# Patient Record
Sex: FEMALE | Race: WHITE
[De-identification: names, ages, dates, MRNs, and addresses within clinical notes are randomized per-mention and may not be internally consistent; named-entity substitution may affect disease eponyms.]

---

## 2019-06-07 ENCOUNTER — HOSPITAL ENCOUNTER (EMERGENCY)
Dept: HOSPITAL 12 - ER | Age: 64
LOS: 1 days | Discharge: HOME | End: 2019-06-08
Payer: COMMERCIAL

## 2019-06-07 VITALS — WEIGHT: 167 LBS | HEIGHT: 66 IN | BODY MASS INDEX: 26.84 KG/M2
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
## 2019-06-07 DIAGNOSIS — T18.128A: Primary | ICD-10-CM
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
## 2019-06-07 DIAGNOSIS — J45.909: ICD-10-CM

## 2019-06-07 PROCEDURE — A4663 DIALYSIS BLOOD PRESSURE CUFF: HCPCS

## 2019-06-07 NOTE — NUR
pt stated at 1500 she felt something in her throat she tried to used different 
methods of getting it out but was unsuccessful and gave to the emergency room 
pt is able to swallow, talk and breath normally without incident but her 
tonsils are swollen with red dots displayed

## 2019-06-08 VITALS — SYSTOLIC BLOOD PRESSURE: 122 MMHG | DIASTOLIC BLOOD PRESSURE: 76 MMHG

## 2019-06-08 NOTE — NUR
pt discharge to home with  remains pain free and condition remains 
stable discharge teaching given pt voiced her understanding well

## 2019-10-04 ENCOUNTER — OFFICE (OUTPATIENT)
Dept: URBAN - METROPOLITAN AREA CLINIC 67 | Facility: CLINIC | Age: 64
End: 2019-10-04

## 2019-10-04 VITALS — HEIGHT: 66 IN | DIASTOLIC BLOOD PRESSURE: 80 MMHG | SYSTOLIC BLOOD PRESSURE: 120 MMHG | WEIGHT: 162 LBS

## 2019-10-04 DIAGNOSIS — D12.6 TUBULAR ADENOMA OF COLON: ICD-10-CM

## 2019-10-04 PROCEDURE — 99213 OFFICE O/P EST LOW 20 MIN: CPT | Performed by: INTERNAL MEDICINE

## 2019-10-04 NOTE — SERVICEHPINOTES
The patient is a gurdeep 63-year-old female referred for colorectal cancer surveillance.  The patient did have three polyps removed in 10/2016, two of which were 11-12 mm. She remains in excellent health, though she does have mild asthma minutes currently being treated.

## 2019-11-07 ENCOUNTER — AMBULATORY SURGICAL CENTER (OUTPATIENT)
Dept: URBAN - METROPOLITAN AREA SURGERY 42 | Facility: SURGERY | Age: 64
End: 2019-11-07

## 2019-11-07 VITALS
RESPIRATION RATE: 14 BRPM | DIASTOLIC BLOOD PRESSURE: 62 MMHG | WEIGHT: 162 LBS | HEART RATE: 52 BPM | HEIGHT: 66 IN | OXYGEN SATURATION: 100 % | TEMPERATURE: 98.4 F | SYSTOLIC BLOOD PRESSURE: 132 MMHG

## 2019-11-07 DIAGNOSIS — K57.30 DVRTCLOS OF LG INT W/O PERFORATION OR ABSCESS W/O BLEEDING: ICD-10-CM

## 2019-11-07 DIAGNOSIS — D12.3 BENIGN NEOPLASM OF TRANSVERSE COLON: ICD-10-CM

## 2019-11-07 DIAGNOSIS — D12.6 BENIGN NEOPLASM OF COLON, UNSPECIFIED: ICD-10-CM

## 2019-11-07 LAB — SURGICAL: PDF REPORT: (no result)

## 2019-11-07 PROCEDURE — 45385 COLONOSCOPY W/LESION REMOVAL: CPT | Performed by: INTERNAL MEDICINE

## 2021-11-16 ENCOUNTER — OFFICE (OUTPATIENT)
Dept: URBAN - METROPOLITAN AREA CLINIC 66 | Facility: CLINIC | Age: 66
End: 2021-11-16

## 2021-11-16 VITALS
TEMPERATURE: 98.3 F | HEIGHT: 66 IN | WEIGHT: 164 LBS | DIASTOLIC BLOOD PRESSURE: 77 MMHG | SYSTOLIC BLOOD PRESSURE: 148 MMHG

## 2021-11-16 DIAGNOSIS — R19.4 ALTERED BOWEL FUNCTION: ICD-10-CM

## 2021-11-16 PROCEDURE — 99213 OFFICE O/P EST LOW 20 MIN: CPT | Performed by: INTERNAL MEDICINE

## 2021-11-16 NOTE — SERVICEHPINOTES
The patient is a gurdeep 66-year-old female referred for colorectal cancer surveillance. The patient did have three polyps removed in 10/2016, two of which were 11-12 mm, and  in 11/2019 another 11 mm TA. She remains in excellent health, though she does have mild asthma minutes currently being treated, an 11/2022 f/u is needed. We discussed constipation, gas, FODMAPs, and going back to fiber and Senna.

## 2022-03-29 ENCOUNTER — OFFICE (OUTPATIENT)
Dept: URBAN - METROPOLITAN AREA CLINIC 66 | Facility: CLINIC | Age: 67
End: 2022-03-29

## 2022-03-29 VITALS — WEIGHT: 163 LBS | SYSTOLIC BLOOD PRESSURE: 133 MMHG | HEIGHT: 66 IN | DIASTOLIC BLOOD PRESSURE: 71 MMHG

## 2022-03-29 DIAGNOSIS — R19.4 ALTERED BOWEL FUNCTION: ICD-10-CM

## 2022-03-29 PROCEDURE — 99213 OFFICE O/P EST LOW 20 MIN: CPT | Performed by: INTERNAL MEDICINE

## 2022-03-29 NOTE — SERVICEHPINOTES
The patient is a gurdeep 66-year-old female referred for colorectal cancer surveillance. The patient did have three polyps removed in 10/2016, two of which were 11-12 mm, and in 11/2019 another 11 mm TA. She remains in excellent health, though she does have mild asthma minutes currently being treated, an 7/2022 f/u is needed. We discussed constipation, gas, FODMAPs, and going back to fiber and Senna: And she will increase her flaxseed from 1 tablespoon a day up to 4, and do the magnesium and senna at bedtime.

## 2022-07-25 ENCOUNTER — AMBULATORY SURGICAL CENTER (OUTPATIENT)
Dept: URBAN - METROPOLITAN AREA SURGERY 42 | Facility: SURGERY | Age: 67
End: 2022-07-25

## 2022-07-25 VITALS
OXYGEN SATURATION: 100 % | DIASTOLIC BLOOD PRESSURE: 69 MMHG | SYSTOLIC BLOOD PRESSURE: 162 MMHG | HEART RATE: 53 BPM | RESPIRATION RATE: 12 BRPM | HEIGHT: 66 IN | WEIGHT: 158 LBS | TEMPERATURE: 97.9 F

## 2022-07-25 DIAGNOSIS — K21.9 GASTRO-ESOPHAGEAL REFLUX DISEASE WITHOUT ESOPHAGITIS: ICD-10-CM

## 2022-07-25 DIAGNOSIS — K63.5 POLYP OF COLON: ICD-10-CM

## 2022-07-25 PROBLEM — K63.89 OTHER SPECIFIED DISEASES OF INTESTINE: Status: ACTIVE | Noted: 2022-07-25

## 2022-07-25 PROBLEM — K44.9 DIAPHRAGMATIC HERNIA WITHOUT OBSTRUCTION OR GANGRENE: Status: ACTIVE | Noted: 2022-07-25

## 2022-07-25 PROBLEM — K31.89 OTHER DISEASES OF STOMACH AND DUODENUM: Status: ACTIVE | Noted: 2022-07-25

## 2022-07-25 LAB — SURGICAL: PDF REPORT: (no result)

## 2022-07-25 PROCEDURE — 43239 EGD BIOPSY SINGLE/MULTIPLE: CPT | Performed by: INTERNAL MEDICINE

## 2022-07-25 PROCEDURE — 45385 COLONOSCOPY W/LESION REMOVAL: CPT | Performed by: INTERNAL MEDICINE

## 2025-06-04 ENCOUNTER — OFFICE (OUTPATIENT)
Dept: URBAN - METROPOLITAN AREA CLINIC 67 | Facility: CLINIC | Age: 70
End: 2025-06-04

## 2025-06-04 VITALS — HEIGHT: 66 IN | SYSTOLIC BLOOD PRESSURE: 138 MMHG | WEIGHT: 137 LBS | DIASTOLIC BLOOD PRESSURE: 70 MMHG

## 2025-06-04 DIAGNOSIS — K58.9 IBS (IRRITABLE BOWEL SYNDROME): ICD-10-CM

## 2025-06-04 DIAGNOSIS — Z86.0101 PERSONAL HISTORY OF ADENOMATOUS AND SERRATED COLON POLYPS: ICD-10-CM

## 2025-06-04 DIAGNOSIS — K92.1 BLACK TARRY STOOLS: ICD-10-CM

## 2025-06-04 PROCEDURE — 99204 OFFICE O/P NEW MOD 45 MIN: CPT | Performed by: NURSE PRACTITIONER

## 2025-06-04 NOTE — SERVICENOTES
Please note that a dictation software was used to generate the above report.   Unfortunately, software programs can generate inadvertent errors during dictation.  Warning of above report should be interpreted with caution and with taking context into consideration.

## 2025-06-04 NOTE — SERVICEHPINOTES
Long-time patient of Dr. Allen's followed colon polyps  and IBS returns for evaluation prior to scheduling a surveillance colonoscopy.  She tells me that she is in good shape at about the same time she is there are reporting irregular bowel habits are especially since on Zepbound with alternating diarrhea and constipation that she also reports intermittent black tarry stools but no hematochezia. Unclear if truly melena so she would be checked for occult blood in the stool and if positive and endoscopy can be added to her colonoscopy.  For her constipation episodes she can increase her flaxseed and magnesium dose.
br
br   Denies N/V, unintentional wt loss
br
br For her globus/swallowing problems the EGD at the time of her last colonoscopy in 2022 showed only mild reflux changes